# Patient Record
Sex: FEMALE
[De-identification: names, ages, dates, MRNs, and addresses within clinical notes are randomized per-mention and may not be internally consistent; named-entity substitution may affect disease eponyms.]

---

## 2018-05-01 ENCOUNTER — HOSPITAL ENCOUNTER (OUTPATIENT)
Dept: HOSPITAL 5 - SPVIMAG | Age: 58
Discharge: HOME | End: 2018-05-01
Attending: ORTHOPAEDIC SURGERY
Payer: MEDICAID

## 2018-05-01 DIAGNOSIS — M17.11: Primary | ICD-10-CM

## 2018-05-02 NOTE — XRAY REPORT
FINAL REPORT



PROCEDURE:  XR KNEE 4+V RT



TECHNIQUE:  RIGHT knee radiographs, AP, lateral and sunrise

views. CPT 84395







HISTORY:  RIGHT KNEE PAIN 



COMPARISON:  No prior studies are available for comparison.



FINDINGS:  

Fracture (s) and/or Dislocation(s): None .



Alignment: Normal .



Joint space(s): There is advanced degenerative arthrosis of the

medial knee compartment. There is moderate degenerative arthrosis

of the patellofemoral joint space..



Soft tissues: There is generalized soft tissue swelling. There is

no joint effusion..



Bone mineralization: Normal .



Foreign bodies: None .







IMPRESSION:  

There is no fracture or malalignment..



There is advanced degenerative arthrosis of the medial knee

compartment. There is moderate degenerative arthrosis of the

patellofemoral joint space..



There is generalized soft tissue swelling. There is no joint

effusion..